# Patient Record
(demographics unavailable — no encounter records)

---

## 2024-10-22 NOTE — DISCUSSION/SUMMARY
[Patient] : the patient [___ Month(s)] : in [unfilled] month(s) [FreeTextEntry1] : Restart Toprol 12.5 mg qd and rachel 12.5 mg qd. Decrease Entresto to 49/51 mg bid. Decrease Bumex to 1 mg qd. RTO in 8 weeks. [EKG obtained to assist in diagnosis and management of assessed problem(s)] : EKG obtained to assist in diagnosis and management of assessed problem(s)

## 2024-10-22 NOTE — ADDENDUM
[FreeTextEntry1] : Called with results of labs notable for HgA1C 6.5%, will increase Farxiga to 10 mg daily from 5 mg and will follow up with PCP.

## 2024-10-22 NOTE — CARDIOLOGY SUMMARY
[de-identified] : 1/8/24 NSR 91,  LAE, RAD, NSST 7/6/23  NSR  LAE, RAD, NSST 6/30/22 NSR  LAE, RAD, NSST 11/22/21 NSR 95, LAE, RAD, NSST with PVC 8/16/21 NSR 96, LAE,  RAD, NSST  [de-identified] :   6/8/20 NST with large, mod to severe defects inferior and inferolateral, fixed and small, mod in basal anterolateral fixed with small, mild defect in septal wall, reversible, suggestive of mild ischemia [de-identified] : 12/3/21 TTE; Echo showed EF 20% with Severe functional mitral regurgitation, severe reduce LVS and RVS function.\par  \par  6/8/20 TTE with LVEF 24%, LVIDD 6.1 cm, mod-severe MR. 1/13/21 TTE with LVEF 16%, LVIDD 6.6 cm, mod MR, no LV thrombus, decrease RV systolic function \par  \par   6/8/20 TTE with LVEF 24%, LVIDD 6.1 cm, mod-severe MR. 1/13/21 TTE with LVEF 16%, LVIDD 6.6 cm, mod MR, no LV thrombus, decrease RV systolic functio\par   [de-identified] : 12/3/21 RHC showed RA: 5,RV: 51/4,PA: \par  52/26, PA sat 62%, CO 3.4, CI 1.7 with left sided high filling pressure ,wedge of 36.\par  \par  1/13/21 with normal RA/PCWP and LHC with mod stenosis in pLAD, IFR negative. \par  \par  \par   1/13/21 pLAD 60% at site of no prior intervention with iFR negative 0.93, mRCA 40% , RHC with normal RA/PCWP low CO/CI  ( 3.33/1.65) and prior LHC 60% RCA on LHC \par

## 2024-10-22 NOTE — ASSESSMENT
[FreeTextEntry1] : NICM, chronic HFrEF, with functional MR. Currently NYHA class 1. Recent hypotension; discontinued some GDMT.

## 2024-10-22 NOTE — HISTORY OF PRESENT ILLNESS
[FreeTextEntry1] : 60 [place] male with CAD (pLAD of 60% with negative IFR), HTN, HLD, chronic smoker, chronic systolic CHF (EF of 16% Jan 2021, LVDd 6.6 cm, mod MR (previously severe), decreased RV function), ICD ([employer]), RHC (2021) with RA 5, RV: 51/4, PA: 52/26, PA sat 62%, CO 3.4, CI 1.7, wedge of 36.   Patient is here for follow up. Was recently admitted at [facility] for low output HF. Tx with milrinone. Now on GDMT.  No orthopnea or PND. Back to work. No limitations in daily activities. Can easily climb several flights of stairs or walk 5-6 blocks. No CP.  For past 2 weeks he has not taken Toprol and rachel b/o hypotension and dizziness. Still taking other cardiac meds.

## 2024-10-22 NOTE — PHYSICAL EXAM
[Well Nourished] : well nourished [No Acute Distress] : no acute distress [Normal Venous Pressure] : normal venous pressure [No Carotid Bruit] : no carotid bruit [Normal S1, S2] : normal S1, S2 [No Gallop] : no gallop [Clear Lung Fields] : clear lung fields [No Respiratory Distress] : no respiratory distress  [Soft] : abdomen soft [Non Tender] : non-tender [No Edema] : no edema [Moves all extremities] : moves all extremities [Alert and Oriented] : alert and oriented [de-identified] : left chest ICD

## 2024-12-23 NOTE — DISCUSSION/SUMMARY
[EKG obtained to assist in diagnosis and management of assessed problem(s)] : EKG obtained to assist in diagnosis and management of assessed problem(s) [FreeTextEntry1] : Impression:  1. Chronic systolic CHF: s/p ICD. ICD checked today revealed episodes of NSVT and elevated CoreVue. alert on 12/7/24 there was one episode of VT/VF successfully treated with ATPx1, then on 12/8/24 at 18:59, there was another episode of VT/VF with rates to 260bpm treated with ATPx1, then successfully treated with one 800v shock. Additionally, there were a few other episodes of VT/VF noted as well with rates to 275bpm. Patient not compliant with taking medications. Did not feel shock at the time. Refusing medication changes at this time. Can consider ablation but refusing.  Resume OMT as prescribed, encouraged heart healthy diet, daily weight, and regular f/u with Cardiology/Heart failure team as scheduled.  2. HTN: resume oral antihypertensives as prescribed. Encouraged heart healthy diet, sodium restriction, and weight loss. Continue regular f/u with Cardiologist for further HTN management.  3. HLD: resume statin therapy as prescribed and regular f/u with Cardiologist for routine lipid monitoring and management.  Resume regular f/u with Cardiologist and RTO in 6 months.

## 2024-12-23 NOTE — REASON FOR VISIT
[Initial Evaluation] : an initial evaluation of [AICD Check] : implantable cardioverter-defibrillator [Heart Failure] : congestive heart failure [Arrhythmia/ECG Abnorrmalities] : arrhythmia/ECG abnormalities [FreeTextEntry3] : Noel Chery MD

## 2024-12-23 NOTE — CARDIOLOGY SUMMARY
[de-identified] : 12/23/24 1/8/24 NSR 91,  LAE, RAD, NSST 7/6/23  NSR  LAE, RAD, NSST 6/30/22 NSR  LAE, RAD, NSST 11/22/21 NSR 95, LAE, RAD, NSST with PVC 8/16/21 NSR 96, LAE,  RAD, NSST  [de-identified] :   6/8/20 NST with large, mod to severe defects inferior and inferolateral, fixed and small, mod in basal anterolateral fixed with small, mild defect in septal wall, reversible, suggestive of mild ischemia [de-identified] : 12/3/21 TTE; Echo showed EF 20% with Severe functional mitral regurgitation, severe reduce LVS and RVS function.\par  \par  6/8/20 TTE with LVEF 24%, LVIDD 6.1 cm, mod-severe MR. 1/13/21 TTE with LVEF 16%, LVIDD 6.6 cm, mod MR, no LV thrombus, decrease RV systolic function \par  \par   6/8/20 TTE with LVEF 24%, LVIDD 6.1 cm, mod-severe MR. 1/13/21 TTE with LVEF 16%, LVIDD 6.6 cm, mod MR, no LV thrombus, decrease RV systolic functio\par   [de-identified] : 12/3/21 RHC showed RA: 5,RV: 51/4,PA: \par  52/26, PA sat 62%, CO 3.4, CI 1.7 with left sided high filling pressure ,wedge of 36.\par  \par  1/13/21 with normal RA/PCWP and LHC with mod stenosis in pLAD, IFR negative. \par  \par  \par   1/13/21 pLAD 60% at site of no prior intervention with iFR negative 0.93, mRCA 40% , RHC with normal RA/PCWP low CO/CI  ( 3.33/1.65) and prior LHC 60% RCA on LHC \par

## 2025-01-09 NOTE — HISTORY OF PRESENT ILLNESS
[FreeTextEntry1] : 60 [place] male with CAD (pLAD of 60% with negative IFR), HTN, HLD, chronic smoker, chronic systolic CHF (EF of 16% Jan 2021, LVDd 6.6 cm, severe MR, decreased RV function), ICD ([employer]), Was recently admitted at [facility] for low output HF. Tx with milrinone. Now on GDMT.  In December experienced an event in which he felt dizzy and collapsed while with family. Found to have VT/VF and had gotten ICD shock. Denies dyspnea then or now. Working in  in [place] and doesn't experience shortness of breath while on the job. Can climb 2 flights of steps. No orthopnea or PND. He denies any chest pain as well.  He spaces out his meds. No longer experiences dizziness or low BP.

## 2025-01-09 NOTE — DISCUSSION/SUMMARY
[Patient] : the patient [With Me] : with me [___ Month(s)] : in [unfilled] month(s) [EKG obtained to assist in diagnosis and management of assessed problem(s)] : EKG obtained to assist in diagnosis and management of assessed problem(s) [FreeTextEntry1] : - Conduct blood work. - Schedule an echocardiogram. - Continue with current medication.

## 2025-01-09 NOTE — PHYSICAL EXAM
[Well Nourished] : well nourished [No Acute Distress] : no acute distress [Normal Venous Pressure] : normal venous pressure [No Carotid Bruit] : no carotid bruit [Normal S1, S2] : normal S1, S2 [No Gallop] : no gallop [Clear Lung Fields] : clear lung fields [No Respiratory Distress] : no respiratory distress  [Soft] : abdomen soft [Non Tender] : non-tender [No Edema] : no edema [Moves all extremities] : moves all extremities [Alert and Oriented] : alert and oriented [de-identified] : freq ectopy

## 2025-01-09 NOTE — ASSESSMENT
[FreeTextEntry1] : Condition is stable; he's mostly asymptomatic, except for a recent VT/VF ICD shock.

## 2025-02-28 NOTE — PHYSICAL EXAM
[General Appearance - Well Developed] : well developed [General Appearance - Well Nourished] : well nourished [Normal Appearance] : normal appearance [Well Groomed] : well groomed [General Appearance - In No Acute Distress] : no acute distress [Abdomen Soft] : soft [Abdomen Tenderness] : non-tender [Costovertebral Angle Tenderness] : no ~M costovertebral angle tenderness [Edema] : no peripheral edema [] : no respiratory distress [Respiration, Rhythm And Depth] : normal respiratory rhythm and effort [Exaggerated Use Of Accessory Muscles For Inspiration] : no accessory muscle use [Oriented To Time, Place, And Person] : oriented to person, place, and time [Affect] : the affect was normal [Mood] : the mood was normal [Not Anxious] : not anxious [Normal Station and Gait] : the gait and station were normal for the patient's age [No Focal Deficits] : no focal deficits [No Palpable Adenopathy] : no palpable adenopathy [FreeTextEntry1] : PARRISH slightly enlarged, no nodules or indurations

## 2025-02-28 NOTE — HISTORY OF PRESENT ILLNESS
[FreeTextEntry1] : 60 yo male seen previously for BPH. Symptoms controlled with tamsulosin 0.4 mg daily. States if he does not take for 3-4 days urinary symptoms worsen. PSA previously elevated but decrease to 3.9 with tamsulosin.   PVR 0 ml

## 2025-02-28 NOTE — ASSESSMENT
[FreeTextEntry1] : 60 yo male with BPH currently taking tamsulosin 0.4 mg daily. Tamsulosin works very well when taking. States he has decreased libido with tamsulosin. Will change to silodosin. Also will have pt fu in 3 months to assess symptoms on silodosin and check AM testosterone  Patient with elevated psa that decreased when irritative symptoms relieved. Will check PSA today and if > 4 discussed with patient he will need prostate MRI and discuss biopsy  Plan PVR done today - 0 ml demonstrating patient emptying bladder Urinalysis Urine culture PSA - will call with results. If >4.0 patient will get prostate MRI Will stop tamsulosin and start silodosin 8 mg daily - prescription sent FU 3 months in the morning for AM testosterone and PVR with silodosin   Patient is being seen today for evaluation and management of a chronic and longitudinal ongoing condition and I am of the primary treating physician.

## 2025-03-20 NOTE — HISTORY OF PRESENT ILLNESS
[FreeTextEntry1] : 60 Tuval[place] male with CAD (pLAD of 60% with negative IFR), HTN, HLD, chronic smoker, chronic systolic CHF (EF of 16% Jan 2021, LVDd 6.6 cm, severe MR, decreased RV function), ICD ([employer]), Was admitted at [facility] for low output HF. Tx with milrinone. Now on GDMT.  In December experienced an event in which he felt dizzy and collapsed while with family. Found to have VT/VF and had gotten ICD shock. Denies dyspnea then or now.  Since then has had at least 2 episodes of VT requiring ATP, once iso flu.  Works in  in [place] and doesn't experience shortness of breath while on the job. Can climb 2 flights of steps. Sleeps with the HOB at an incline, mostly for orthopedic comfort. No PND.  He reports a strange vibration in his upper back that occurs intermittently but daily and may last for some time.  He spaces out his meds. He did not take Toprol and rachel before visit today. He occasionally misses Bumex dose if he is working or traveling.  Had echo on 3/4. Still has severe LV dysfnc and significant MR.

## 2025-03-20 NOTE — DISCUSSION/SUMMARY
[EKG obtained to assist in diagnosis and management of assessed problem(s)] : EKG obtained to assist in diagnosis and management of assessed problem(s) [FreeTextEntry1] : Impression:  1. Chronic systolic CHF: s/p ICD.  EKG performed today to assess for presence of conduction disease, pre-excitation or atrial fibrillation reveals NSR 98, LAE, RAD, NSST with PVC's. Remote alert for a VT/VF episode with Successful ATP Therapy Delivered on 3/7 @ 3:57 AM. Three NSVT episodes with no ECGs. Maintained on ASA, Plavix and Metoprolol. Pt was seen in 12/2024 in the setting of ICD shock and noncompliance with meds. He has been compliant since last appt with improvement in LVEF to 36% from 20%. Previously discussed PVC ablatio but complains of a vibration in his chest since having CardioMEMS so will order a CT scan and will f/u in 2 months to schedule PVC/VT ablation.  Resume OMT as prescribed, encouraged heart healthy diet, daily weight, and regular f/u with Cardiology/Heart failure team as scheduled.  2. HTN: resume oral antihypertensives as prescribed. Encouraged heart healthy diet, sodium restriction, and weight loss. Continue regular f/u with Cardiologist for further HTN management.  3. HLD: resume statin therapy as prescribed and regular f/u with Cardiologist for routine lipid monitoring and management.  Resume regular f/u with Cardiologist and RTO in 6 months.

## 2025-03-20 NOTE — PHYSICAL EXAM
[Well Nourished] : well nourished [No Acute Distress] : no acute distress [Normal Venous Pressure] : normal venous pressure [No Carotid Bruit] : no carotid bruit [Normal S1, S2] : normal S1, S2 [No Gallop] : no gallop [Clear Lung Fields] : clear lung fields [No Respiratory Distress] : no respiratory distress  [Soft] : abdomen soft [Non Tender] : non-tender [Moves all extremities] : moves all extremities [Alert and Oriented] : alert and oriented [de-identified] : trace edema

## 2025-03-20 NOTE — DISCUSSION/SUMMARY
[Patient] : the patient [With Me] : with me [___ Month(s)] : in [unfilled] month(s) [EKG obtained to assist in diagnosis and management of assessed problem(s)] : EKG obtained to assist in diagnosis and management of assessed problem(s) [FreeTextEntry1] : - Conduct blood work. - Increase Toprol to 25 mg po bid. - Chest CT to evaluate vibratory sensations in upper back. - F/u in 2 months.

## 2025-03-20 NOTE — HISTORY OF PRESENT ILLNESS
[FreeTextEntry1] : Oswaldo Pratt is a 60y/o man with Hx of HTN, HLD, BPH, all of which are stable, GERD, Hpylori, and chronic systolic CHF s/p ICD placement who presents today for a follow-up after recent episodes of VT/VF treated with ATP 3/7/25. He had a previous f/u in office after ICD shock on 12/18/24. He had an admission at [facility] 12/2024  for low output HF. Tx with milrinone. Now on GDMT. He had a remote alert for a VT/VF episode with Successful ATP Therapy Delivered on 3/7 @ 3:57 AM. Three NSVT episodes with no ECGs. Maintained on ASA, Plavix and Metoprolol. He had an echo 3/4/25 TTE: LVEF 36% from prior LVEF 20% 7/5/224. Reports he has been compliant with all his meds and has been staggering them to avoid dizziness when he was taking together. Complains of a vibration in his left back since d/c. He is feeling better overall and states he can walk several blocks and climb stairs without dyspnea. He denies orthopnea. Denies chest pain, palpitations, SOB at rest, syncope or near syncope.

## 2025-03-20 NOTE — CARDIOLOGY SUMMARY
[de-identified] : 3/20/25 NSR 98,  LAE, RAD, NSST with PVC's 1/8/24 NSR 91,  LAE, RAD, NSST 7/6/23  NSR  LAE, RAD, NSST 6/30/22 NSR  LAE, RAD, NSST 11/22/21 NSR 95, LAE, RAD, NSST with PVC 8/16/21 NSR 96, LAE,  RAD, NSST  [de-identified] :   6/8/20 NST with large, mod to severe defects inferior and inferolateral, fixed and small, mod in basal anterolateral fixed with small, mild defect in septal wall, reversible, suggestive of mild ischemia [de-identified] : 3/4/25 TTE: LVEF 36% . Technically difficult image quality.  2. Left ventricular systolic function is severely decreased with an ejection fraction of 36 % by Bhardwaj's method of disks. Global left ventricular hypokinesis.  3. There is severe (grade 3) left ventricular diastolic dysfunction.  4. Left atrium is moderately dilated.  5. The right atrium is normal in size.  6. Normal right ventricular cavity size, with normal wall thickness, and probably normal right ventricular systolic function.  7. There is a device lead seen in the right atrium.  8. At least moderate mitral regurgitation.  9. There is calcification of the mitral valve annulus. 10. Trileaflet aortic valve with normal systolic excursion. There is calcification of the aortic valve leaflets. 11. Structurally normal tricuspid valve with normal leaflet excursion. 12. No echocardiographic evidence of pulmonary hypertension. 13. No pericardial effusion seen.  12/3/21 TTE; Echo showed EF 20% with Severe functional mitral regurgitation, severe reduce LVS and RVS function.  6/8/20 TTE with LVEF 24%, LVIDD 6.1 cm, mod-severe MR. 1/13/21 TTE with LVEF 16%, LVIDD 6.6 cm, mod MR, no LV thrombus, decrease RV systolic function    6/8/20 TTE with LVEF 24%, LVIDD 6.1 cm, mod-severe MR. 1/13/21 TTE with LVEF 16%, LVIDD 6.6 cm, mod MR, no LV thrombus, decrease RV systolic functio  [de-identified] : 12/3/21 RHC showed RA: 5,RV: 51/4,PA: \par  52/26, PA sat 62%, CO 3.4, CI 1.7 with left sided high filling pressure ,wedge of 36.\par  \par  1/13/21 with normal RA/PCWP and LHC with mod stenosis in pLAD, IFR negative. \par  \par  \par   1/13/21 pLAD 60% at site of no prior intervention with iFR negative 0.93, mRCA 40% , RHC with normal RA/PCWP low CO/CI  ( 3.33/1.65) and prior LHC 60% RCA on LHC \par

## 2025-03-20 NOTE — ASSESSMENT
[FreeTextEntry1] : Condition is stable; he's mostly asymptomatic. Likely NYHA class 2. Occasional NSVT. Will be evaluated by Dr. Calderon today. Considering VT ablation.

## 2025-06-23 NOTE — HISTORY OF PRESENT ILLNESS
[FreeTextEntry1] : Oswaldo Pratt is a 62y/o man with Hx of HTN, HLD, BPH, all of which are stable, GERD, Hpylori, and chronic systolic CHF s/p ICD placement who presents today for a follow-up after recent episodes of VT/VF treated with ATP 3/7/25. He had a previous f/u in office after ICD shock on 12/18/24. He had an admission at [facility] 12/2024  for low output HF. Tx with milrinone. Now on GDMT. He had a remote alert for a VT/VF episode with Successful ATP Therapy Delivered on 3/7 @ 3:57 AM. Three NSVT episodes with no ECGs. Maintained on ASA, Plavix and Metoprolol. He had an echo 3/4/25 TTE: LVEF 36% from prior LVEF 20% 7/5/224. Reports he has been compliant with all his meds and has been staggering them to avoid dizziness when he was taking together. Alert for a SVT/VT episode on 5/20 @ 2:17PM ATP times 3 aborted CVRT 800 V.  One SVT/VT episode with ATP on 5/21 @ 3:26AM. Maintained on ASA, Plavix, Digoxin and Metoprolol. Complains of a vibration in his left back since d/c. He is feeling better overall and states he can walk several blocks and climb stairs without dyspnea. He denies orthopnea. Denies chest pain, palpitations, SOB at rest, syncope or near syncope.

## 2025-06-23 NOTE — DISCUSSION/SUMMARY
[Patient] : the patient [With Me] : with me [___ Month(s)] : in [unfilled] month(s) [EKG obtained to assist in diagnosis and management of assessed problem(s)] : EKG obtained to assist in diagnosis and management of assessed problem(s) [FreeTextEntry1] : Discussed with Dr. Chery.  Recommend continued abstinence from ETOH and Smoking.  Will request and AT evaluation at [facility]. Stop Isosorbide and Hydralazine and change Losartan to Entresto 24/26mg BID and restart Farxiga  and admit for AT evaluation.  Check labs today.   F/u in 2 months.

## 2025-06-23 NOTE — ASSESSMENT
[FreeTextEntry1] : ACC/AHA stage C/D, NYHA class lll.  Appears decompensated with hypervolemia and hypotension.

## 2025-06-23 NOTE — END OF VISIT
[Time Spent: ___ minutes] : I have spent [unfilled] minutes of time on the encounter which excludes teaching and separately reported services. [FreeTextEntry3] : Pt doing poorly after recent hospitalization. Has debilitating fatigue, orthopnea, and PND. Wt is up 5 lbs. Directed pt to go to the ER for admission. Will likely end up with PICC for home milrinone.

## 2025-06-23 NOTE — HISTORY OF PRESENT ILLNESS
[FreeTextEntry1] : 60 [place] male with CAD (pLAD of 60% with negative IFR), HTN, HLD, chronic smoker, chronic systolic CHF (EF of 16% Jan 2021, LVDd 6.6 cm, severe MR, decreased RV function), mod/severe TR ICD ([employer]), Was admitted at [facility] for low output HF. Tx with milrinone.  In 6/2025 hospitalization, discussion was held with patient about possible advanced therapies, but barriers include tobacco, ETOH and some nonadherence to medications. He presents for follow-up. Comes with sisters, Milly and Pema.   Previously, he had an event in which he felt dizzy and collapsed while with family. Found to have VT/VF and had gotten ICD shock. Denies dyspnea then or now. Since then has had at least 2 episodes of VT requiring ATP, once iso flu.  Patient presents today 6/23/25 for follow-up.  He was hospitalized 5/22-6/5/25.  He presented to ER with worsening shortness of breath and weight gain over past several days found to be in acute on chronic decompensated systolic heart failure with concerns of SCAI stage C given renal dysfunction, hepatic dysfunction. A swan was placed for monitoring. on 5/27/25. He was treated for HF stage D, NYHA class III, hypervolemia and cardiogenic shock requiring milrinone gtt. Discussion held with patient about possible advanced therapies, but barriers include tobacco, ETOH and some nonadherence to medications.  Patient requesting discharge off milrinone and will follow up closely on OP basis with Dr. Chery and [facility] advanced therapies team .  Works in  in [place] but is not very active.  Mostly sedentary.  He has not tried to walk up steps. Sleeps with the HOB at an incline and 1 pillow which is the same as before hospital admission. He wakes us with some anxiety and trouble breathing.  Unchanged for white some time.   He has not had any ETOH or smoked since his hospitalization 6/2025.   He has not been eating well since hospitalization.  He feels he cannot swallow properly. He states he had a swallow study in hospital, but it was normal.   Has a lot of fatigue. Denies any SOB with showering.   Weigh at home is 165-166lbs, D/w Weight: 161lbs 6/5/25, in office is 176lbs.  BP: at home 80/70, in office is 78/57 and 93/65.  Denies LH

## 2025-06-23 NOTE — CARDIOLOGY SUMMARY
[de-identified] : 6/23/25 SR, First degree AVB, LA enlargement. IVCD, PVC's, Low voltage in limb leads.

## 2025-06-23 NOTE — DISCUSSION/SUMMARY
[Patient] : the patient [With Me] : with me [___ Month(s)] : in [unfilled] month(s) [EKG obtained to assist in diagnosis and management of assessed problem(s)] : EKG obtained to assist in diagnosis and management of assessed problem(s) [FreeTextEntry1] : Discussed with Dr. Chery.  Recommend continued abstinence from ETOH and Smoking.  Will request and AT evaluation at [facility]. Stop Isosorbide and Hydralazine and change Losartan to Entresto 24/26mg BID and restart Farxiga  and admit for AT evaluation.  Check labs today.   F/u in 2 months.

## 2025-06-23 NOTE — PHYSICAL EXAM
[Well Nourished] : well nourished [No Acute Distress] : no acute distress [Normal Venous Pressure] : normal venous pressure [No Carotid Bruit] : no carotid bruit [Normal S1, S2] : normal S1, S2 [No Gallop] : no gallop [Clear Lung Fields] : clear lung fields [No Respiratory Distress] : no respiratory distress  [Soft] : abdomen soft [Non Tender] : non-tender [Moves all extremities] : moves all extremities [Alert and Oriented] : alert and oriented [de-identified] : trace edema [Normal Gait] : normal gait [No Edema] : no edema [No Rash] : no rash [de-identified] : JVD 10-12cm, with HJR with V-Waves [de-identified] : l/Vl MENDOZA at left axilla  [de-identified] : mild distension

## 2025-06-23 NOTE — HISTORY OF PRESENT ILLNESS
[FreeTextEntry1] : 60 [place] male with CAD (pLAD of 60% with negative IFR), HTN, HLD, chronic smoker, chronic systolic CHF (EF of 16% Jan 2021, LVDd 6.6 cm, severe MR, decreased RV function), mod/severe TR ICD ([facility]), Was admitted at [facility] for low output HF. Tx with milrinone.  In 6/2025 hospitalization, discussion was held with patient about possible advanced therapies, but barriers include tobacco, ETOH and some nonadherence to medications. He presents for follow-up. Comes with sisters, Milly and Pema.   Previously, he had an event in which he felt dizzy and collapsed while with family. Found to have VT/VF and had gotten ICD shock. Denies dyspnea then or now. Since then has had at least 2 episodes of VT requiring ATP, once iso flu.  Patient presents today 6/23/25 for follow-up.  He was hospitalized 5/22-6/5/25.  He presented to ER with worsening shortness of breath and weight gain over past several days found to be in acute on chronic decompensated systolic heart failure with concerns of SCAI stage C given renal dysfunction, hepatic dysfunction. A swan was placed for monitoring. on 5/27/25. He was treated for HF stage D, NYHA class III, hypervolemia and cardiogenic shock requiring milrinone gtt. Discussion held with patient about possible advanced therapies, but barriers include tobacco, ETOH and some nonadherence to medications.  Patient requesting discharge off milrinone and will follow up closely on OP basis with Dr. Chery and [facility] advanced therapies team .  Works in  in [place] but is not very active.  Mostly sedentary.  He has not tried to walk up steps. Sleeps with the HOB at an incline and 1 pillow which is the same as before hospital admission. He wakes us with some anxiety and trouble breathing.  Unchanged for white some time.   He has not had any ETOH or smoked since his hospitalization 6/2025.   He has not been eating well since hospitalization.  He feels he cannot swallow properly. He states he had a swallow study in hospital, but it was normal.   Has a lot of fatigue. Denies any SOB with showering.   Weigh at home is 165-166lbs, D/w Weight: 161lbs 6/5/25, in office is 176lbs.  BP: at home 80/70, in office is 78/57 and 93/65.  Denies LH

## 2025-06-23 NOTE — CARDIOLOGY SUMMARY
[de-identified] : 6/23/25 SR, First degree AVB, LA enlargement. IVCD, PVC's, Low voltage in limb leads.

## 2025-06-23 NOTE — PHYSICAL EXAM
[Well Nourished] : well nourished [No Acute Distress] : no acute distress [Normal Venous Pressure] : normal venous pressure [No Carotid Bruit] : no carotid bruit [Normal S1, S2] : normal S1, S2 [No Gallop] : no gallop [Clear Lung Fields] : clear lung fields [No Respiratory Distress] : no respiratory distress  [Soft] : abdomen soft [Non Tender] : non-tender [Moves all extremities] : moves all extremities [Alert and Oriented] : alert and oriented [de-identified] : trace edema [Normal Gait] : normal gait [No Edema] : no edema [No Rash] : no rash [de-identified] : JVD 10-12cm, with HJR with V-Waves [de-identified] : l/Vl MENDOZA at left axilla  [de-identified] : mild distension

## 2025-06-23 NOTE — DISCUSSION/SUMMARY
[FreeTextEntry1] : Impression:  1. Chronic systolic CHF: s/p ICD.  DIOR now reached. Quick check of ICD reveals no further VT shocks. Given ICD now at DIOR, recommend undergoing routine gen change.  The rationale for device replacement as well as the procedural risks--including but not limited to pocket hematoma, infection, pneumothorax, pericardial bleed/tamponade, lead dislodgement, and death--were reviewed in detail. After consideration of this information, the decision was made to proceed with ICD gen change.  Resume OMT as prescribed, encouraged heart healthy diet, daily weight, and regular f/u with Cardiology/Heart failure team as scheduled.  2. HTN: resume oral antihypertensives as prescribed. Encouraged heart healthy diet, sodium restriction, and weight loss. Continue regular f/u with Cardiologist for further HTN management.  3. HLD: resume statin therapy as prescribed and regular f/u with Cardiologist for routine lipid monitoring and management.  Plan for ICD gen change.

## 2025-06-23 NOTE — CARDIOLOGY SUMMARY
[de-identified] : 3/20/25 NSR 98,  LAE, RAD, NSST with PVC's 1/8/24 NSR 91,  LAE, RAD, NSST 7/6/23  NSR  LAE, RAD, NSST 6/30/22 NSR  LAE, RAD, NSST 11/22/21 NSR 95, LAE, RAD, NSST with PVC 8/16/21 NSR 96, LAE,  RAD, NSST  [de-identified] :   6/8/20 NST with large, mod to severe defects inferior and inferolateral, fixed and small, mod in basal anterolateral fixed with small, mild defect in septal wall, reversible, suggestive of mild ischemia [de-identified] : 3/4/25 TTE: LVEF 36% . Technically difficult image quality.  2. Left ventricular systolic function is severely decreased with an ejection fraction of 36 % by Bhardwaj's method of disks. Global left ventricular hypokinesis.  3. There is severe (grade 3) left ventricular diastolic dysfunction.  4. Left atrium is moderately dilated.  5. The right atrium is normal in size.  6. Normal right ventricular cavity size, with normal wall thickness, and probably normal right ventricular systolic function.  7. There is a device lead seen in the right atrium.  8. At least moderate mitral regurgitation.  9. There is calcification of the mitral valve annulus. 10. Trileaflet aortic valve with normal systolic excursion. There is calcification of the aortic valve leaflets. 11. Structurally normal tricuspid valve with normal leaflet excursion. 12. No echocardiographic evidence of pulmonary hypertension. 13. No pericardial effusion seen.  12/3/21 TTE; Echo showed EF 20% with Severe functional mitral regurgitation, severe reduce LVS and RVS function.  6/8/20 TTE with LVEF 24%, LVIDD 6.1 cm, mod-severe MR. 1/13/21 TTE with LVEF 16%, LVIDD 6.6 cm, mod MR, no LV thrombus, decrease RV systolic function    6/8/20 TTE with LVEF 24%, LVIDD 6.1 cm, mod-severe MR. 1/13/21 TTE with LVEF 16%, LVIDD 6.6 cm, mod MR, no LV thrombus, decrease RV systolic functio  [de-identified] : 12/3/21 RHC showed RA: 5,RV: 51/4,PA: \par  52/26, PA sat 62%, CO 3.4, CI 1.7 with left sided high filling pressure ,wedge of 36.\par  \par  1/13/21 with normal RA/PCWP and LHC with mod stenosis in pLAD, IFR negative. \par  \par  \par   1/13/21 pLAD 60% at site of no prior intervention with iFR negative 0.93, mRCA 40% , RHC with normal RA/PCWP low CO/CI  ( 3.33/1.65) and prior LHC 60% RCA on LHC \par